# Patient Record
Sex: MALE | Race: WHITE | URBAN - METROPOLITAN AREA
[De-identification: names, ages, dates, MRNs, and addresses within clinical notes are randomized per-mention and may not be internally consistent; named-entity substitution may affect disease eponyms.]

---

## 2018-03-09 PROBLEM — M51.16 LUMBAR DISC HERNIATION WITH RADICULOPATHY: Chronic | Status: ACTIVE | Noted: 2018-03-09

## 2018-03-09 PROBLEM — M51.26 LUMBAR DISC HERNIATION: Chronic | Status: ACTIVE | Noted: 2018-03-09

## 2021-07-06 ENCOUNTER — TELEPHONE (OUTPATIENT)
Dept: NEUROSURGERY | Age: 36
End: 2021-07-06

## 2021-07-16 ENCOUNTER — OFFICE VISIT (OUTPATIENT)
Dept: NEUROSURGERY | Age: 36
End: 2021-07-16
Payer: COMMERCIAL

## 2021-07-16 VITALS — TEMPERATURE: 97.1 F | WEIGHT: 235 LBS | BODY MASS INDEX: 30.16 KG/M2 | HEIGHT: 74 IN

## 2021-07-16 DIAGNOSIS — M51.16 LUMBAR DISC HERNIATION WITH RADICULOPATHY: Primary | ICD-10-CM

## 2021-07-16 DIAGNOSIS — M51.26 LUMBAR DISC HERNIATION: ICD-10-CM

## 2021-07-16 PROCEDURE — 99213 OFFICE O/P EST LOW 20 MIN: CPT | Performed by: NEUROLOGICAL SURGERY

## 2021-07-16 ASSESSMENT — ENCOUNTER SYMPTOMS
CONSTIPATION: 0
SHORTNESS OF BREATH: 0
DIARRHEA: 0
BACK PAIN: 1
NAUSEA: 0

## 2021-07-16 NOTE — PROGRESS NOTES
[facility] ([facility]) Physicians  Neurosurgery and Pain [address] [facility], [place]: [phone]  F: [phone]       Abebe Duran  (1985)    7/16/2021    Referred by No ref. provider found    Subjective:     Abebe Duran is 39 y.o. male who complains today of:    Chief Complaint   Patient presents with    Back Pain     He is here for follow up to back pain. Feels slightly worse compared to last visit. Previous pain management with Dr. Zaire Bruner. He complains of worsening lower back pain with occasional radiation to left leg. History of lumbar diskectomy 2015 and 2013. Incision site is clean and dry. He uses Goody's powder OTC, Motrin or Tylenol prn. He has been seen by the chiropractor. He smokes cigarettes. Work status: Full time at [employer]  Pertinent negatives include no fever or headaches. Allergies:  Patient has no known allergies. No past medical history on file. Past Surgical History:   Procedure Laterality Date    KNEE SURGERY      SPINE SURGERY       Family History   Problem Relation Age of Onset    Heart Disease Father     High Blood Pressure Father      Social History     Socioeconomic History    Marital status: Single     Spouse name: Not on file    Number of children: Not on file    Years of education: Not on file    Highest education level: Not on file   Occupational History    Not on file   Tobacco Use    Smoking status: Current Every Day Smoker    Smokeless tobacco: Current User     Types: Chew   Substance and Sexual Activity    Alcohol use:  Yes    Drug use: No    Sexual activity: Not on file   Other Topics Concern    Not on file   Social History Narrative    Not on file     Social Determinants of Health     Financial Resource Strain:     Difficulty of Paying Living Expenses:    Food Insecurity:     Worried About Running Out of Food in the Last Year:     [address] in the Last Year:    Transportation Needs:     Lack of Transportation (Medical):  Lack of Transportation (Non-Medical):    Physical Activity:     Days of Exercise per Week:     Minutes of Exercise per Session:    Stress:     Feeling of Stress :    Social Connections:     Frequency of Communication with Friends and Family:     Frequency of Social Gatherings with Friends and Family:     Attends Buddhism Services:     Active Member of Clubs or Organizations:     Attends Club or Organization Meetings:     Marital Status:    Intimate Partner Violence:     Fear of Current or Ex-Partner:     Emotionally Abused:     Physically Abused:     Sexually Abused:        Current Outpatient Medications on File Prior to Visit   Medication Sig Dispense Refill    venlafaxine (EFFEXOR) 37.5 MG tablet Take 37.5 mg by mouth daily      celecoxib (CELEBREX) 200 MG capsule Take 200 mg by mouth daily       No current facility-administered medications on file prior to visit. Review of Systems   Constitutional: Negative for fever. HENT: Negative for hearing loss. Respiratory: Negative for shortness of breath. Gastrointestinal: Negative for constipation, diarrhea and nausea. Genitourinary: Negative for difficulty urinating. Musculoskeletal: Positive for back pain and neck pain. Skin: Negative for rash. Neurological: Negative for headaches. Hematological: Does not bruise/bleed easily. Psychiatric/Behavioral: Positive for sleep disturbance. Objective:     Vitals:  Temp 97.1 °F (36.2 °C)   Ht 6' 2\" (1.88 m)   Wt 235 lb (106.6 kg)   BMI 30.17 kg/m²        Physical Exam  Constitutional:       Appearance: Normal appearance. HENT:      Head: Normocephalic and atraumatic. Mouth/Throat:      Mouth: Mucous membranes are moist.      Pharynx: Oropharynx is clear. Eyes:      Extraocular Movements: Extraocular movements intact.       Pupils: Pupils are equal, round, and reactive to light. Cardiovascular:      Rate and Rhythm: Regular rhythm. Pulses: Normal pulses. Musculoskeletal:      Lumbar back: Tenderness present. Decreased range of motion. Skin:     General: Skin is warm and dry. Neurological:      Mental Status: He is alert and oriented to person, place, and time. Sensory: Sensory deficit (Hypalgesia of bilateral posterior calves.) present. Gait: Gait normal.      Deep Tendon Reflexes:      Reflex Scores:       Patellar reflexes are 2+ on the right side and 2+ on the left side. Achilles reflexes are 2+ on the right side and 2+ on the left side. Comments: Incision site is clean and dry of lumbar spine. 5/5 bilateral quadriceps, iliopsoas, gastrocnemius, TA and EHL. Negative bilateral straight leg-raise. No ankle clonus bilaterally. Psychiatric:         Mood and Affect: Mood normal.           Assessment:      Diagnosis Orders   1. L3-4 Vertebral disc displacement L3-4 with radiculopathy      2. L2-3 Disc protrusion/bulge         Plan:     Patient returns my office continued follow-up. Have his see me previously with history of work injury requiring surgical intervention of lumbar spine. Still having mostly back pain without radiculopathy. Somewhat worsened since last visit a year ago. Patient currently off medications. Has undergone chiropractic service without much benefit recently    Still working. Good strength and tone is noted straight leg raising    Gait is normal intact light touch normal reflexes. Impression: Lumbar spondylosis mechanical back pain history of work injury status post multiple surgeries. Discussed the patient at this point further treatment options. Patient is to recommend continue with the conservative treatments as much as possible. When the pain is in severe enough to consider surgical intervention with MRI and further diagnostic studies.     Follow up:  Return if symptoms worsen or fail to improve.     Renita Christensen MD